# Patient Record
(demographics unavailable — no encounter records)

---

## 2025-03-03 NOTE — BIRTH HISTORY
[Non-Contributory] : Non-contributory [Duration: ___ wks] : duration: [unfilled] weeks Libtayo Counseling- I discussed with the patient the risks of Libtayo including but not limited to nausea, vomiting, diarrhea, and bone or muscle pain.  The patient verbalized understanding of the proper use and possible adverse effects of Libtayo.  All of the patient's questions and concerns were addressed.

## 2025-03-06 NOTE — HISTORY OF PRESENT ILLNESS
[FreeTextEntry1] : 14-year-old female, otherwise healthy presents today with parents for spine evaluation.  She reports pediatrician noticed scoliosis a few years ago and has recommended orthopedic evaluation in the past few years.  She reports occasional back stiffness.  Back seems tired towards the end of the day.  She enjoys cracking her back.  She denies activity limitations.  She denies extremity numbness, tingling, weakness, bowel or bladder dysfunction.  She reports menarche in August 2021.  No known family history of scoliosis.  She presents today for further evaluation and management regarding the same

## 2025-03-06 NOTE — DATA REVIEWED
[de-identified] : 3/3/2025: AP and lateral full-length standing spine x-ray ordered, obtained and independently reviewed today revealing right thoracic curve measuring about 20 degrees.  Risser 5.  No obvious deformity in the lateral plane.  No spondylolisthesis

## 2025-03-06 NOTE — REASON FOR VISIT
[Consultation] : a consultation visit [Patient] : patient [Parents] : parents [FreeTextEntry1] : Spine evaluation

## 2025-03-06 NOTE — ASSESSMENT
[FreeTextEntry1] : 14-year-old female with adolescent idiopathic scoliosis, postural kyphosis, skeletally mature  Today's assessment was performed with the assistance of the patient's parent as an independent historian to corroborate the patients history. Clinical exam and imaging reviewed with parent and patient at length. Natural history discussed.  Child is 14 years of age, Risser 5 with menarche reported August 2021.  Patient is skeletally mature.  Scoliosis is unlikely to progress.  Scoliosis currently measures about 20degrees.  Observation only has been recommended.  Treatment algorithm for scoliosis has been discussed.  Bracing is warranted for curves measuring greater than 25 degrees with skeletal growth remaining.  The parent understands that the braces do not correct curves permanently and that there is 30% risk brace failure. Parent understands the risk of curve progression needing surgery. Surgery is recommended for scoliosis measuring greater than 45 degrees.  As for postural kyphosis and back pain, I have recommended regular exercise for back and core strengthening and postural support.  Home exercise regimen with exercises to be done at least 5 days a week has also been recommended.  Home exercise handout sheet has been provided.  Activities as tolerated.  I have recommended follow-up in 6 months with AP and lateral spine x-ray at that time.  All questions answered, understanding verbalized. Patient and family in agreement with plan of care.  Natural history of spine deformity discussed. Risk of progression explained. Spine deformity can cause back pain later on and also arthritis, though usually later.. Spine deformity can affect organ systems,such as lungs, less commonly heart and GI etc over time depending on curve size and progression.Deformity can progress with growth but can continue to progress later on based on the size of the curve. It can also effect patient's height due to the curve..It usually does not impact activities and has no limitations, however activities may be limited due to pain or rarely breathlessness with large curves. Scoliosis is usually not impacted by daily activities- sleeping position, sitting position, lifting heavy weights etc, however posture and back pain can be affected by some of these.Stretching, exercises, bone health and nutrition are important factors in the long run.Spine deformity may have genetics etiology and so siblings and progenies should be evaluated.For scoliosis, curves less than 25 degrees are usually managed with observation. Bracing is warranted for curves measuring greater than 25 degrees with skeletal growth remaining.  Braces do not correct curves permanently and there is a 30% risk brace failure. Surgery is recommended for scoliosis measuring greater than 45 degrees.  This note was generated using Dragon medical dictation software. A reasonable effort has been made for proofreading its contents, but typos may still remain. If there are any questions or points of clarification needed please do not hesitate to contact my office.  We spent 45 minutes on HPI, Clinical exam, ordering/ reviewing all imaging, reviewing any existing record, reviewing findings and counseling patient to treatment, differentials, etiology, prognosis, natural history, implications on ADLs, activities limitations/modifications,  answering questions and addressing concerns, treatment goals and documenting in the EHR.

## 2025-03-06 NOTE — DATA REVIEWED
[de-identified] : 3/3/2025: AP and lateral full-length standing spine x-ray ordered, obtained and independently reviewed today revealing right thoracic curve measuring about 20 degrees.  Risser 5.  No obvious deformity in the lateral plane.  No spondylolisthesis